# Patient Record
Sex: MALE | Race: WHITE | Employment: OTHER | URBAN - METROPOLITAN AREA
[De-identification: names, ages, dates, MRNs, and addresses within clinical notes are randomized per-mention and may not be internally consistent; named-entity substitution may affect disease eponyms.]

---

## 2019-07-30 ENCOUNTER — HOSPITAL ENCOUNTER (OUTPATIENT)
Dept: NON INVASIVE DIAGNOSTICS | Age: 74
Discharge: HOME | End: 2019-07-30
Payer: OTHER GOVERNMENT

## 2019-07-30 LAB
LV EF: 58 %
LVEF MODALITY: NORMAL

## 2019-07-30 PROCEDURE — 93306 TTE W/DOPPLER COMPLETE: CPT

## 2021-06-24 ENCOUNTER — HOSPITAL ENCOUNTER (OUTPATIENT)
Dept: NON INVASIVE DIAGNOSTICS | Age: 76
Discharge: HOME | End: 2021-06-24
Payer: MEDICARE

## 2021-06-24 ENCOUNTER — HOSPITAL ENCOUNTER (OUTPATIENT)
Age: 76
Discharge: HOME | End: 2021-06-24
Payer: MEDICARE

## 2021-06-24 LAB
A/G RATIO: 1.5 (ref 1.1–2.2)
ALBUMIN SERPL-MCNC: 4.4 G/DL (ref 3.4–5)
ALP BLD-CCNC: 81 U/L (ref 40–129)
ALT SERPL-CCNC: 22 U/L (ref 10–40)
ANION GAP SERPL CALCULATED.3IONS-SCNC: 11 MMOL/L (ref 3–16)
AST SERPL-CCNC: 22 U/L (ref 15–37)
BILIRUB SERPL-MCNC: 0.7 MG/DL (ref 0–1)
BILIRUBIN URINE: NEGATIVE
BLOOD, URINE: NEGATIVE
BUN BLDV-MCNC: 16 MG/DL (ref 7–20)
CALCIUM SERPL-MCNC: 9.4 MG/DL (ref 8.3–10.6)
CHLORIDE BLD-SCNC: 101 MMOL/L (ref 99–110)
CLARITY: CLEAR
CO2: 25 MMOL/L (ref 21–32)
COLOR: YELLOW
CREAT SERPL-MCNC: 0.7 MG/DL (ref 0.8–1.3)
EPITHELIAL CELLS, UA: 2 /HPF (ref 0–5)
GFR AFRICAN AMERICAN: >60
GFR NON-AFRICAN AMERICAN: >60
GLOBULIN: 2.9 G/DL
GLUCOSE BLD-MCNC: 116 MG/DL (ref 70–99)
GLUCOSE URINE: NEGATIVE MG/DL
HYALINE CASTS: 2 /LPF (ref 0–8)
KETONES, URINE: NEGATIVE MG/DL
LEUKOCYTE ESTERASE, URINE: NEGATIVE
LV EF: 58 %
LVEF MODALITY: NORMAL
MICROSCOPIC EXAMINATION: YES
NITRITE, URINE: NEGATIVE
PH UA: 7 (ref 5–8)
POTASSIUM SERPL-SCNC: 4.3 MMOL/L (ref 3.5–5.1)
PROTEIN UA: ABNORMAL MG/DL
RBC UA: 2 /HPF (ref 0–4)
SODIUM BLD-SCNC: 137 MMOL/L (ref 136–145)
SPECIFIC GRAVITY UA: >1.03 (ref 1–1.03)
TOTAL PROTEIN: 7.3 G/DL (ref 6.4–8.2)
URINE TYPE: ABNORMAL
UROBILINOGEN, URINE: 1 E.U./DL
WBC UA: 1 /HPF (ref 0–5)

## 2021-06-24 PROCEDURE — 93306 TTE W/DOPPLER COMPLETE: CPT

## 2021-06-24 PROCEDURE — 81001 URINALYSIS AUTO W/SCOPE: CPT

## 2021-06-24 PROCEDURE — 80053 COMPREHEN METABOLIC PANEL: CPT

## 2024-07-04 ENCOUNTER — OFFICE VISIT (OUTPATIENT)
Age: 79
End: 2024-07-04

## 2024-07-04 VITALS
HEIGHT: 69 IN | SYSTOLIC BLOOD PRESSURE: 151 MMHG | OXYGEN SATURATION: 93 % | HEART RATE: 75 BPM | RESPIRATION RATE: 15 BRPM | TEMPERATURE: 98.6 F | DIASTOLIC BLOOD PRESSURE: 73 MMHG | WEIGHT: 255 LBS | BODY MASS INDEX: 37.77 KG/M2

## 2024-07-04 DIAGNOSIS — L03.115 CELLULITIS OF RIGHT LOWER LEG: Primary | ICD-10-CM

## 2024-07-04 RX ORDER — METFORMIN HYDROCHLORIDE 500 MG/1
500 TABLET, FILM COATED, EXTENDED RELEASE ORAL
COMMUNITY

## 2024-07-04 RX ORDER — CEPHALEXIN 500 MG/1
500 CAPSULE ORAL 2 TIMES DAILY
Qty: 14 CAPSULE | Refills: 0 | Status: SHIPPED | OUTPATIENT
Start: 2024-07-04 | End: 2024-07-11

## 2024-07-04 RX ORDER — SULFAMETHOXAZOLE AND TRIMETHOPRIM 800; 160 MG/1; MG/1
1 TABLET ORAL 2 TIMES DAILY
Qty: 14 TABLET | Refills: 0 | Status: SHIPPED | OUTPATIENT
Start: 2024-07-04 | End: 2024-07-11

## 2024-07-04 RX ORDER — LOSARTAN POTASSIUM 50 MG/1
50 TABLET ORAL
COMMUNITY
Start: 2024-07-01

## 2024-07-04 RX ORDER — GABAPENTIN 300 MG/1
300 CAPSULE ORAL
COMMUNITY
Start: 2023-08-21

## 2024-07-04 NOTE — PATIENT INSTRUCTIONS
Discharge medications reviewed with the patient and appropriate educational materials and side effects teaching were provided.  Seek immediate medical care if:    You have signs that your infection is getting worse, such as:  Increased pain, swelling, warmth, or redness.  Red streaks leading from the area.  Pus draining from the area.  A fever.   Blood pressure elevated at today's visit. Educated on 'red flags' on when to go to the ED in regards to blood pressure (headache, chest pain, shortness of breath, swelling of body parts, ringing of the ears, and dizziness). Encouraged to remember BP and diuretic med.

## 2024-07-04 NOTE — PROGRESS NOTES
Jose Armando Morrison (:  1945) is a 78 y.o. male,New patient, here for evaluation of the following chief complaint(s):  Wound Infection (Wound on right lower calf since )      Assessment & Plan :  Visit Diagnoses and Associated Orders       Cellulitis of right lower leg    -  Primary    sulfamethoxazole-trimethoprim (BACTRIM DS;SEPTRA DS) 800-160 MG per tablet [14462]      cephALEXin (KEFLEX) 500 MG capsule [8710]           ORDERS WITHOUT AN ASSOCIATED DIAGNOSIS    gabapentin (NEURONTIN) 300 MG capsule [70535]      losartan (COZAAR) 50 MG tablet [94123]      metFORMIN, MOD, (GLUMETZA) 500 MG extended release tablet [29041]                seek immediate medical care if:    You have signs that your infection is getting worse, such as:  Increased pain, swelling, warmth, or redness.  Red streaks leading from the area.  Pus draining from the area.  A fever.   Pt states did not take BP meds or diuretic today.    Subjective :  Wound Infection (Wound on right lower calf since )  BLE edema noted. Pt states did not take BP med or diuretic today.  Area to RLE is red, swollen and tender.      History provided by:  Patient      78 y.o. male presents with symptoms of cellulitis to RLE.         Vitals:    24 1729 24 1739   BP: (!) 173/77 (!) 151/73   Site: Right Upper Arm Left Upper Arm   Position: Sitting Sitting   Cuff Size: Large Adult Large Adult   Pulse: 75    Resp: 15    Temp: 98.6 °F (37 °C)    TempSrc: Oral    SpO2: 93%    Weight: 115.7 kg (255 lb)    Height: 1.753 m (5' 9\")        No results found for this visit on 24.      Objective   Physical Exam  Constitutional:       General: He is not in acute distress.     Appearance: He is well-developed.   HENT:      Right Ear: External ear normal.      Left Ear: External ear normal.      Nose: Nose normal.      Mouth/Throat:      Lips: Pink.   Eyes:      General: Lids are normal.   Cardiovascular:      Rate and Rhythm: Normal rate.   Pulmonary:

## 2024-12-22 ENCOUNTER — OFFICE VISIT (OUTPATIENT)
Age: 79
End: 2024-12-22

## 2024-12-22 VITALS
DIASTOLIC BLOOD PRESSURE: 84 MMHG | BODY MASS INDEX: 34.8 KG/M2 | WEIGHT: 235 LBS | HEIGHT: 69 IN | OXYGEN SATURATION: 93 % | TEMPERATURE: 99.3 F | RESPIRATION RATE: 16 BRPM | SYSTOLIC BLOOD PRESSURE: 156 MMHG | HEART RATE: 59 BPM

## 2024-12-22 DIAGNOSIS — M54.2 NECK PAIN: Primary | ICD-10-CM

## 2024-12-22 RX ORDER — METHOCARBAMOL 750 MG/1
750 TABLET, FILM COATED ORAL 3 TIMES DAILY
Qty: 30 TABLET | Refills: 0 | Status: SHIPPED | OUTPATIENT
Start: 2024-12-22 | End: 2025-01-01